# Patient Record
Sex: MALE | URBAN - METROPOLITAN AREA
[De-identification: names, ages, dates, MRNs, and addresses within clinical notes are randomized per-mention and may not be internally consistent; named-entity substitution may affect disease eponyms.]

---

## 2022-01-04 ENCOUNTER — EMERGENCY (EMERGENCY)
Facility: HOSPITAL | Age: 87
LOS: 1 days | Discharge: LEFT BEFORE DISCHARGE | End: 2022-01-04
Admitting: EMERGENCY MEDICINE
Payer: SELF-PAY

## 2022-01-04 VITALS
RESPIRATION RATE: 18 BRPM | SYSTOLIC BLOOD PRESSURE: 95 MMHG | DIASTOLIC BLOOD PRESSURE: 67 MMHG | OXYGEN SATURATION: 99 % | HEART RATE: 68 BPM | TEMPERATURE: 98 F

## 2022-01-04 PROCEDURE — L9991: CPT

## 2022-01-04 NOTE — ED ADULT TRIAGE NOTE - EXPLANATION OF PATIENT'S REASON FOR LEAVING
Pt anxious about flight to [place]; MARY Quiroz spoke to pt and granddaughter urging them to stay, they got in car and departed,. Pt anxious about flight to [place] tomorrow; MARY Quiroz spoke to pt and granddaughter urging them to stay, they got in car and departed,.

## 2022-01-04 NOTE — ED ADULT TRIAGE NOTE - CHIEF COMPLAINT QUOTE
Pt AOX4 c/o syncopal episode in car with granddaughter, granddaughter said he didn't look well, was staring into space and fiddling absentmindedly with his hat, lost control of urine; has no dx Hx of seizures but granddaughter states this has happened before Pt AOX4 c/o syncopal episode in car with granddaughter, granddaughter said he didn't look well, was staring into space and fiddling absentmindedly with his hat, lost control of urine; has no dx Hx of seizures but granddaughter states this has happened before  Please keep Shadene informed (granddaughter) [phone]